# Patient Record
Sex: FEMALE | URBAN - METROPOLITAN AREA
[De-identification: names, ages, dates, MRNs, and addresses within clinical notes are randomized per-mention and may not be internally consistent; named-entity substitution may affect disease eponyms.]

---

## 2020-12-21 ENCOUNTER — APPOINTMENT (RX ONLY)
Dept: URBAN - METROPOLITAN AREA CLINIC 56 | Facility: CLINIC | Age: 70
Setting detail: DERMATOLOGY
End: 2020-12-21

## 2020-12-21 DIAGNOSIS — Z85.828 PERSONAL HISTORY OF OTHER MALIGNANT NEOPLASM OF SKIN: ICD-10-CM

## 2020-12-21 DIAGNOSIS — Z71.89 OTHER SPECIFIED COUNSELING: ICD-10-CM

## 2020-12-21 DIAGNOSIS — D22 MELANOCYTIC NEVI: ICD-10-CM

## 2020-12-21 DIAGNOSIS — L82.1 OTHER SEBORRHEIC KERATOSIS: ICD-10-CM

## 2020-12-21 PROBLEM — D22.71 MELANOCYTIC NEVI OF RIGHT LOWER LIMB, INCLUDING HIP: Status: ACTIVE | Noted: 2020-12-21

## 2020-12-21 PROBLEM — D22.72 MELANOCYTIC NEVI OF LEFT LOWER LIMB, INCLUDING HIP: Status: ACTIVE | Noted: 2020-12-21

## 2020-12-21 PROCEDURE — ? COUNSELING

## 2020-12-21 PROCEDURE — 99213 OFFICE O/P EST LOW 20 MIN: CPT

## 2020-12-21 ASSESSMENT — LOCATION SIMPLE DESCRIPTION DERM
LOCATION SIMPLE: LEFT FOREHEAD
LOCATION SIMPLE: LEFT UPPER BACK
LOCATION SIMPLE: RIGHT POSTERIOR THIGH
LOCATION SIMPLE: LEFT POSTERIOR THIGH

## 2020-12-21 ASSESSMENT — LOCATION DETAILED DESCRIPTION DERM
LOCATION DETAILED: LEFT DISTAL POSTERIOR THIGH
LOCATION DETAILED: LEFT MEDIAL UPPER BACK
LOCATION DETAILED: RIGHT DISTAL POSTERIOR THIGH
LOCATION DETAILED: LEFT INFERIOR MEDIAL FOREHEAD

## 2020-12-21 ASSESSMENT — LOCATION ZONE DERM
LOCATION ZONE: TRUNK
LOCATION ZONE: LEG
LOCATION ZONE: FACE

## 2021-06-04 ENCOUNTER — APPOINTMENT (RX ONLY)
Dept: URBAN - METROPOLITAN AREA CLINIC 56 | Facility: CLINIC | Age: 71
Setting detail: DERMATOLOGY
End: 2021-06-04

## 2021-06-04 DIAGNOSIS — D22 MELANOCYTIC NEVI: ICD-10-CM

## 2021-06-04 DIAGNOSIS — L82.1 OTHER SEBORRHEIC KERATOSIS: ICD-10-CM

## 2021-06-04 DIAGNOSIS — Z85.828 PERSONAL HISTORY OF OTHER MALIGNANT NEOPLASM OF SKIN: ICD-10-CM

## 2021-06-04 DIAGNOSIS — Z71.89 OTHER SPECIFIED COUNSELING: ICD-10-CM

## 2021-06-04 PROBLEM — D22.5 MELANOCYTIC NEVI OF TRUNK: Status: ACTIVE | Noted: 2021-06-04

## 2021-06-04 PROBLEM — D22.21 MELANOCYTIC NEVI OF RIGHT EAR AND EXTERNAL AURICULAR CANAL: Status: ACTIVE | Noted: 2021-06-04

## 2021-06-04 PROCEDURE — ? COUNSELING

## 2021-06-04 PROCEDURE — 99213 OFFICE O/P EST LOW 20 MIN: CPT

## 2021-06-04 PROCEDURE — ? SUNSCREEN RECOMMENDATIONS

## 2021-06-04 ASSESSMENT — LOCATION SIMPLE DESCRIPTION DERM
LOCATION SIMPLE: LEFT LOWER BACK
LOCATION SIMPLE: RIGHT EAR
LOCATION SIMPLE: RIGHT CHEEK
LOCATION SIMPLE: UPPER BACK
LOCATION SIMPLE: LEFT UPPER BACK

## 2021-06-04 ASSESSMENT — LOCATION DETAILED DESCRIPTION DERM
LOCATION DETAILED: RIGHT ANTERIOR EARLOBE
LOCATION DETAILED: SUPERIOR THORACIC SPINE
LOCATION DETAILED: LEFT SUPERIOR MEDIAL MIDBACK
LOCATION DETAILED: LEFT MID-UPPER BACK
LOCATION DETAILED: RIGHT SUPERIOR LATERAL BUCCAL CHEEK
LOCATION DETAILED: INFERIOR THORACIC SPINE

## 2021-06-04 ASSESSMENT — LOCATION ZONE DERM
LOCATION ZONE: EAR
LOCATION ZONE: FACE
LOCATION ZONE: TRUNK

## 2021-06-04 NOTE — PROCEDURE: SUNSCREEN RECOMMENDATIONS
Detail Level: Detailed
General Sunscreen Counseling: Patient will use sunscreen lotion and protection unexposed areas during the sherice months of the year.  Patient should use an SPF #50 or higher sunscreen product.

## 2021-12-10 ENCOUNTER — APPOINTMENT (RX ONLY)
Dept: URBAN - METROPOLITAN AREA CLINIC 56 | Facility: CLINIC | Age: 71
Setting detail: DERMATOLOGY
End: 2021-12-10

## 2021-12-10 DIAGNOSIS — D22 MELANOCYTIC NEVI: ICD-10-CM

## 2021-12-10 DIAGNOSIS — L81.4 OTHER MELANIN HYPERPIGMENTATION: ICD-10-CM

## 2021-12-10 DIAGNOSIS — L82.1 OTHER SEBORRHEIC KERATOSIS: ICD-10-CM

## 2021-12-10 DIAGNOSIS — Z85.828 PERSONAL HISTORY OF OTHER MALIGNANT NEOPLASM OF SKIN: ICD-10-CM

## 2021-12-10 PROBLEM — D22.5 MELANOCYTIC NEVI OF TRUNK: Status: ACTIVE | Noted: 2021-12-10

## 2021-12-10 PROCEDURE — ? COUNSELING

## 2021-12-10 PROCEDURE — 99213 OFFICE O/P EST LOW 20 MIN: CPT

## 2021-12-10 ASSESSMENT — LOCATION DETAILED DESCRIPTION DERM
LOCATION DETAILED: SUPERIOR LUMBAR SPINE
LOCATION DETAILED: LEFT MEDIAL UPPER BACK
LOCATION DETAILED: RIGHT INFERIOR CENTRAL MALAR CHEEK
LOCATION DETAILED: NASAL SUPRATIP

## 2021-12-10 ASSESSMENT — LOCATION ZONE DERM
LOCATION ZONE: NOSE
LOCATION ZONE: TRUNK
LOCATION ZONE: FACE

## 2021-12-10 ASSESSMENT — LOCATION SIMPLE DESCRIPTION DERM
LOCATION SIMPLE: LEFT UPPER BACK
LOCATION SIMPLE: NOSE
LOCATION SIMPLE: RIGHT CHEEK
LOCATION SIMPLE: LOWER BACK

## 2022-06-10 ENCOUNTER — APPOINTMENT (RX ONLY)
Dept: URBAN - METROPOLITAN AREA CLINIC 56 | Facility: CLINIC | Age: 72
Setting detail: DERMATOLOGY
End: 2022-06-10

## 2022-06-10 DIAGNOSIS — D18.0 HEMANGIOMA: ICD-10-CM

## 2022-06-10 DIAGNOSIS — D22 MELANOCYTIC NEVI: ICD-10-CM

## 2022-06-10 DIAGNOSIS — L21.8 OTHER SEBORRHEIC DERMATITIS: ICD-10-CM

## 2022-06-10 DIAGNOSIS — Z85.828 PERSONAL HISTORY OF OTHER MALIGNANT NEOPLASM OF SKIN: ICD-10-CM

## 2022-06-10 DIAGNOSIS — L82.1 OTHER SEBORRHEIC KERATOSIS: ICD-10-CM

## 2022-06-10 PROBLEM — D22.5 MELANOCYTIC NEVI OF TRUNK: Status: ACTIVE | Noted: 2022-06-10

## 2022-06-10 PROBLEM — D18.01 HEMANGIOMA OF SKIN AND SUBCUTANEOUS TISSUE: Status: ACTIVE | Noted: 2022-06-10

## 2022-06-10 PROCEDURE — 99213 OFFICE O/P EST LOW 20 MIN: CPT

## 2022-06-10 PROCEDURE — ? TREATMENT REGIMEN

## 2022-06-10 ASSESSMENT — LOCATION SIMPLE DESCRIPTION DERM
LOCATION SIMPLE: RIGHT UPPER BACK
LOCATION SIMPLE: HAIR
LOCATION SIMPLE: RIGHT BREAST
LOCATION SIMPLE: UPPER BACK

## 2022-06-10 ASSESSMENT — LOCATION ZONE DERM
LOCATION ZONE: TRUNK
LOCATION ZONE: SCALP

## 2022-06-10 ASSESSMENT — LOCATION DETAILED DESCRIPTION DERM
LOCATION DETAILED: SUPERIOR THORACIC SPINE
LOCATION DETAILED: HAIR
LOCATION DETAILED: RIGHT SUPERIOR MEDIAL UPPER BACK
LOCATION DETAILED: RIGHT MEDIAL BREAST 2-3:00 REGION

## 2022-06-10 NOTE — PROCEDURE: TREATMENT REGIMEN
Continue Regimen: Patient will continue use of TGel shampoo as needed.  Patient mention she has not been able to find this in the store and I gave her samples of DHS tar shampoo to use in place.
Detail Level: Zone

## 2022-06-10 NOTE — PROCEDURE: MIPS QUALITY
Detail Level: Detailed
Quality 110: Preventive Care And Screening: Influenza Immunization: Influenza Immunization not Administered for Documented Reasons.
Quality 111:Pneumonia Vaccination Status For Older Adults: Pneumococcal vaccine administered on or after patient’s 60th birthday and before the end of the measurement period
Quality 226: Preventive Care And Screening: Tobacco Use: Screening And Cessation Intervention: Patient screened for tobacco use and is an ex/non-smoker

## 2022-06-10 NOTE — HPI: OTHER
Condition:: General skin exam
Please Describe Your Condition:: Patient returns requesting general skin exam.  She is status post Mohs excision of morpheaform BCC on right nasal tip area and treatment of other basal cell carcinomas.  She denies any problems or previous surgical sites.  Patient's general health has been stable other than some unsteadiness.  She has not noted any new skin lesions which she feels are suspicious for skin cancer.

## 2022-12-19 ENCOUNTER — APPOINTMENT (RX ONLY)
Dept: URBAN - METROPOLITAN AREA CLINIC 56 | Facility: CLINIC | Age: 72
Setting detail: DERMATOLOGY
End: 2022-12-19

## 2022-12-19 DIAGNOSIS — L82.1 OTHER SEBORRHEIC KERATOSIS: ICD-10-CM

## 2022-12-19 DIAGNOSIS — D22 MELANOCYTIC NEVI: ICD-10-CM

## 2022-12-19 DIAGNOSIS — L81.4 OTHER MELANIN HYPERPIGMENTATION: ICD-10-CM

## 2022-12-19 DIAGNOSIS — L21.8 OTHER SEBORRHEIC DERMATITIS: ICD-10-CM

## 2022-12-19 DIAGNOSIS — Z85.828 PERSONAL HISTORY OF OTHER MALIGNANT NEOPLASM OF SKIN: ICD-10-CM

## 2022-12-19 PROBLEM — D22.21 MELANOCYTIC NEVI OF RIGHT EAR AND EXTERNAL AURICULAR CANAL: Status: ACTIVE | Noted: 2022-12-19

## 2022-12-19 PROBLEM — D22.5 MELANOCYTIC NEVI OF TRUNK: Status: ACTIVE | Noted: 2022-12-19

## 2022-12-19 PROCEDURE — 99213 OFFICE O/P EST LOW 20 MIN: CPT

## 2022-12-19 PROCEDURE — ? TREATMENT REGIMEN

## 2022-12-19 ASSESSMENT — LOCATION ZONE DERM
LOCATION ZONE: EAR
LOCATION ZONE: NECK
LOCATION ZONE: TRUNK
LOCATION ZONE: FACE

## 2022-12-19 ASSESSMENT — LOCATION SIMPLE DESCRIPTION DERM
LOCATION SIMPLE: RIGHT UPPER BACK
LOCATION SIMPLE: UPPER BACK
LOCATION SIMPLE: NECK
LOCATION SIMPLE: RIGHT EAR
LOCATION SIMPLE: SUPERIOR FOREHEAD

## 2022-12-19 ASSESSMENT — LOCATION DETAILED DESCRIPTION DERM
LOCATION DETAILED: SUPERIOR THORACIC SPINE
LOCATION DETAILED: RIGHT CENTRAL LATERAL NECK
LOCATION DETAILED: RIGHT ANTIHELIX
LOCATION DETAILED: SUPERIOR MID FOREHEAD
LOCATION DETAILED: RIGHT MEDIAL UPPER BACK

## 2022-12-19 NOTE — PROCEDURE: TREATMENT REGIMEN
Continue Regimen: Patient will continue use of TGel shampoo Or DHS tar gel shampoo as needed.
Detail Level: Zone

## 2022-12-19 NOTE — HPI: OTHER
Condition:: General skin exam
Please Describe Your Condition:: Patient returns for general skin exam.  She is status post Mohs excision of morpheaform basal cell carcinoma on the right nasal tip area and treatment of other basal cell carcinomas elsewhere on her skin surface.  She denies any problems or previous surgical sites.  She points out several tan verrucous papules about the trunk and right postauricular area she would like checked.  Patient has seborrheic dermatitis involving the scalp and periodically uses T-Gel or DHS tar shampoo which has been helpful.  Her medical history since I last saw her is significant for finding of a nodule in the left parotid gland.  This was excised and she reports it was benign.  She also has had some memory issues and has had MRI, brain scan and bone marrow testing done.

## 2023-07-03 ENCOUNTER — APPOINTMENT (RX ONLY)
Dept: URBAN - METROPOLITAN AREA CLINIC 56 | Facility: CLINIC | Age: 73
Setting detail: DERMATOLOGY
End: 2023-07-03

## 2023-07-03 DIAGNOSIS — Z71.89 OTHER SPECIFIED COUNSELING: ICD-10-CM

## 2023-07-03 DIAGNOSIS — Z85.828 PERSONAL HISTORY OF OTHER MALIGNANT NEOPLASM OF SKIN: ICD-10-CM

## 2023-07-03 DIAGNOSIS — D22 MELANOCYTIC NEVI: ICD-10-CM

## 2023-07-03 DIAGNOSIS — L82.1 OTHER SEBORRHEIC KERATOSIS: ICD-10-CM

## 2023-07-03 PROBLEM — D22.5 MELANOCYTIC NEVI OF TRUNK: Status: ACTIVE | Noted: 2023-07-03

## 2023-07-03 PROCEDURE — ? SUNSCREEN RECOMMENDATIONS

## 2023-07-03 PROCEDURE — 99213 OFFICE O/P EST LOW 20 MIN: CPT

## 2023-07-03 ASSESSMENT — LOCATION SIMPLE DESCRIPTION DERM
LOCATION SIMPLE: UPPER BACK
LOCATION SIMPLE: SUPERIOR FOREHEAD
LOCATION SIMPLE: NECK

## 2023-07-03 ASSESSMENT — LOCATION ZONE DERM
LOCATION ZONE: NECK
LOCATION ZONE: TRUNK
LOCATION ZONE: FACE

## 2023-07-03 ASSESSMENT — LOCATION DETAILED DESCRIPTION DERM
LOCATION DETAILED: SUPERIOR THORACIC SPINE
LOCATION DETAILED: RIGHT CENTRAL LATERAL NECK
LOCATION DETAILED: SUPERIOR MID FOREHEAD

## 2023-07-03 NOTE — HPI: OTHER
Condition:: General skin exam
Please Describe Your Condition:: Patient returns requesting general skin exam.  She is status post Mohs excision of basal cell carcinoma on the nasal tip area as well as treatment of other basal cell carcinomas.  She denies any problems with previous surgical sites.  She notes increasing numbers of tan verrucous papules about the trunk and scalp.  None of them particular bother her.  She has seborrheic dermatitis and uses Neutrogena T-Gel shampoo periodically.  Patient's general healthIs significant for some difficulty ambulating.

## 2024-07-05 ENCOUNTER — APPOINTMENT (RX ONLY)
Dept: URBAN - METROPOLITAN AREA CLINIC 56 | Facility: CLINIC | Age: 74
Setting detail: DERMATOLOGY
End: 2024-07-05

## 2024-07-05 DIAGNOSIS — L21.8 OTHER SEBORRHEIC DERMATITIS: ICD-10-CM

## 2024-07-05 DIAGNOSIS — L82.1 OTHER SEBORRHEIC KERATOSIS: ICD-10-CM

## 2024-07-05 DIAGNOSIS — Z85.828 PERSONAL HISTORY OF OTHER MALIGNANT NEOPLASM OF SKIN: ICD-10-CM

## 2024-07-05 DIAGNOSIS — B07.8 OTHER VIRAL WARTS: ICD-10-CM

## 2024-07-05 DIAGNOSIS — D22 MELANOCYTIC NEVI: ICD-10-CM

## 2024-07-05 DIAGNOSIS — Z71.89 OTHER SPECIFIED COUNSELING: ICD-10-CM

## 2024-07-05 PROBLEM — D22.21 MELANOCYTIC NEVI OF RIGHT EAR AND EXTERNAL AURICULAR CANAL: Status: ACTIVE | Noted: 2024-07-05

## 2024-07-05 PROCEDURE — ? LIQUID NITROGEN

## 2024-07-05 PROCEDURE — ? TREATMENT REGIMEN

## 2024-07-05 PROCEDURE — ? SUNSCREEN RECOMMENDATIONS

## 2024-07-05 PROCEDURE — 99213 OFFICE O/P EST LOW 20 MIN: CPT | Mod: 25

## 2024-07-05 PROCEDURE — ? COUNSELING

## 2024-07-05 PROCEDURE — 17110 DESTRUCTION B9 LES UP TO 14: CPT

## 2024-07-05 ASSESSMENT — LOCATION SIMPLE DESCRIPTION DERM
LOCATION SIMPLE: RIGHT FOREARM
LOCATION SIMPLE: RIGHT TEMPLE
LOCATION SIMPLE: HAIR
LOCATION SIMPLE: LEFT LOWER BACK
LOCATION SIMPLE: RIGHT HAND
LOCATION SIMPLE: RIGHT EAR

## 2024-07-05 ASSESSMENT — LOCATION ZONE DERM
LOCATION ZONE: ARM
LOCATION ZONE: SCALP
LOCATION ZONE: EAR
LOCATION ZONE: FACE
LOCATION ZONE: TRUNK
LOCATION ZONE: HAND

## 2024-07-05 ASSESSMENT — LOCATION DETAILED DESCRIPTION DERM
LOCATION DETAILED: RIGHT ANTERIOR EARLOBE
LOCATION DETAILED: RIGHT DISTAL DORSAL FOREARM
LOCATION DETAILED: RIGHT RADIAL DORSAL HAND
LOCATION DETAILED: HAIR
LOCATION DETAILED: LEFT SUPERIOR MEDIAL MIDBACK
LOCATION DETAILED: RIGHT INFERIOR TEMPLE

## 2024-07-05 NOTE — HPI: OTHER
Condition:: General skin exam
Please Describe Your Condition:: Patient returns requesting general skin exam.  She has a past history of treatment of multiple basal cell carcinomas including Mohs excision of morpheaform basal cell carcinoma on the right nasal tip.  She denies any problems with the previous surgical sites.  She has not noted any new or changing skin lesion which she feels are suggestive of new skin cancer.  She complains of a irritated verrucous papule on the right forearm which is developed over the past several months and would like this removed.  She has problems with seborrheic dermatitis involving the scalp and periodically uses T-Gel shampoo.Patient is compliant with use of sunscreen lotion and protection.

## 2024-07-05 NOTE — PROCEDURE: LIQUID NITROGEN
Medical Necessity Clause: This procedure was medically necessary because the lesions that were treated were:
Medical Necessity Information: It is in your best interest to select a reason for this procedure from the list below. All of these items fulfill various CMS LCD requirements except the new and changing color options.
Spray Paint Technique: No
Duration Of Freeze Thaw-Cycle (Seconds): 10-15
Show Applicator Variable?: Yes
Detail Level: Detailed
Post-Care Instructions: I reviewed with the patient in detail post-care instructions. Patient is to wear sunprotection, and avoid picking at any of the treated lesions. Pt may apply Bacitracin to crusted or scabbing areas.
Consent: The patient's consent was obtained including but not limited to risks of crusting, scabbing, blistering, scarring, darker or lighter pigmentary change, recurrence, incomplete removal and infection.
Spray Paint Text: The liquid nitrogen was applied to the skin utilizing a spray paint frosting technique.
Number Of Freeze-Thaw Cycles: 2 freeze-thaw cycles
Application Tool (Optional): Cotton Tipped Applicator

## 2025-07-11 ENCOUNTER — APPOINTMENT (OUTPATIENT)
Dept: URBAN - METROPOLITAN AREA CLINIC 56 | Facility: CLINIC | Age: 75
Setting detail: DERMATOLOGY
End: 2025-07-11

## 2025-07-11 DIAGNOSIS — L21.8 OTHER SEBORRHEIC DERMATITIS: ICD-10-CM

## 2025-07-11 DIAGNOSIS — Z85.828 PERSONAL HISTORY OF OTHER MALIGNANT NEOPLASM OF SKIN: ICD-10-CM

## 2025-07-11 DIAGNOSIS — L82.1 OTHER SEBORRHEIC KERATOSIS: ICD-10-CM

## 2025-07-11 DIAGNOSIS — Z71.89 OTHER SPECIFIED COUNSELING: ICD-10-CM

## 2025-07-11 DIAGNOSIS — L81.4 OTHER MELANIN HYPERPIGMENTATION: ICD-10-CM

## 2025-07-11 PROCEDURE — ? SUNSCREEN RECOMMENDATIONS

## 2025-07-11 PROCEDURE — ? TREATMENT REGIMEN

## 2025-07-11 ASSESSMENT — LOCATION DETAILED DESCRIPTION DERM
LOCATION DETAILED: RIGHT INFERIOR UPPER BACK
LOCATION DETAILED: LEFT MEDIAL FRONTAL SCALP
LOCATION DETAILED: LEFT MEDIAL MALAR CHEEK
LOCATION DETAILED: LEFT MEDIAL UPPER BACK

## 2025-07-11 ASSESSMENT — LOCATION ZONE DERM
LOCATION ZONE: TRUNK
LOCATION ZONE: SCALP
LOCATION ZONE: FACE

## 2025-07-11 ASSESSMENT — LOCATION SIMPLE DESCRIPTION DERM
LOCATION SIMPLE: RIGHT UPPER BACK
LOCATION SIMPLE: LEFT SCALP
LOCATION SIMPLE: LEFT CHEEK
LOCATION SIMPLE: LEFT UPPER BACK